# Patient Record
Sex: FEMALE | Race: BLACK OR AFRICAN AMERICAN | NOT HISPANIC OR LATINO | URBAN - METROPOLITAN AREA
[De-identification: names, ages, dates, MRNs, and addresses within clinical notes are randomized per-mention and may not be internally consistent; named-entity substitution may affect disease eponyms.]

---

## 2022-02-01 ENCOUNTER — PREPPED CHART (OUTPATIENT)
Dept: URBAN - METROPOLITAN AREA CLINIC 2 | Facility: CLINIC | Age: 77
End: 2022-02-01

## 2022-02-01 PROBLEM — H35.033 HYPERTENSIVE RETINOPATHY: Noted: 2022-02-01

## 2022-02-01 PROBLEM — H31.103 DOMINANT DRUSEN: Noted: 2022-02-01

## 2022-02-01 PROBLEM — H31.103 PERIPHERAL DRUSEN: Noted: 2022-02-01

## 2022-02-01 PROBLEM — Z96.1 PSEUDOPHAKIA: Noted: 2022-02-01

## 2022-02-01 PROBLEM — H35.3231 NEOVASCULAR AMD WITH ACTIVE CNV: Noted: 2022-02-01

## 2022-03-11 ASSESSMENT — VISUAL ACUITY
OD_CC: 20/20
OS_CC: 20/20-1

## 2022-03-11 ASSESSMENT — TONOMETRY
OD_IOP_MMHG: 10
OS_IOP_MMHG: 10

## 2022-03-15 ENCOUNTER — FOLLOW UP (OUTPATIENT)
Dept: URBAN - METROPOLITAN AREA CLINIC 2 | Facility: CLINIC | Age: 77
End: 2022-03-15

## 2022-03-15 DIAGNOSIS — H35.3231: ICD-10-CM

## 2022-03-15 DIAGNOSIS — H35.033: ICD-10-CM

## 2022-03-15 DIAGNOSIS — H31.103: ICD-10-CM

## 2022-03-15 PROCEDURE — 92202 OPSCPY EXTND ON/MAC DRAW: CPT

## 2022-03-15 PROCEDURE — 92134 CPTRZ OPH DX IMG PST SGM RTA: CPT

## 2022-03-15 PROCEDURE — 99214 OFFICE O/P EST MOD 30 MIN: CPT

## 2022-03-15 ASSESSMENT — VISUAL ACUITY
OS_SC: 20/25-1
OD_SC: 20/40-1
OD_PH: 20/25-2

## 2022-03-15 ASSESSMENT — TONOMETRY
OS_IOP_MMHG: 17
OD_IOP_MMHG: 17

## 2022-04-26 ENCOUNTER — FOLLOW UP (OUTPATIENT)
Dept: URBAN - METROPOLITAN AREA CLINIC 2 | Facility: CLINIC | Age: 77
End: 2022-04-26

## 2022-04-26 DIAGNOSIS — H35.3231: ICD-10-CM

## 2022-04-26 DIAGNOSIS — H35.033: ICD-10-CM

## 2022-04-26 DIAGNOSIS — H31.103: ICD-10-CM

## 2022-04-26 PROCEDURE — 92202 OPSCPY EXTND ON/MAC DRAW: CPT

## 2022-04-26 PROCEDURE — 92134 CPTRZ OPH DX IMG PST SGM RTA: CPT

## 2022-04-26 PROCEDURE — 92014 COMPRE OPH EXAM EST PT 1/>: CPT

## 2022-04-26 ASSESSMENT — VISUAL ACUITY
OD_SC: 20/50
OD_PH: 20/30-2
OS_SC: 20/25-2

## 2022-04-26 ASSESSMENT — TONOMETRY
OD_IOP_MMHG: 12
OS_IOP_MMHG: 12

## 2022-06-07 ENCOUNTER — FOLLOW UP (OUTPATIENT)
Dept: URBAN - METROPOLITAN AREA CLINIC 2 | Facility: CLINIC | Age: 77
End: 2022-06-07

## 2022-06-07 DIAGNOSIS — H35.033: ICD-10-CM

## 2022-06-07 DIAGNOSIS — H35.3231: ICD-10-CM

## 2022-06-07 DIAGNOSIS — H31.103: ICD-10-CM

## 2022-06-07 PROCEDURE — 92202 OPSCPY EXTND ON/MAC DRAW: CPT

## 2022-06-07 PROCEDURE — 92014 COMPRE OPH EXAM EST PT 1/>: CPT

## 2022-06-07 PROCEDURE — 92134 CPTRZ OPH DX IMG PST SGM RTA: CPT

## 2022-06-07 ASSESSMENT — VISUAL ACUITY
OS_SC: 20/25-2
OD_SC: 20/30-3

## 2022-06-07 ASSESSMENT — TONOMETRY
OD_IOP_MMHG: 13
OS_IOP_MMHG: 13

## 2022-06-15 ENCOUNTER — APPOINTMENT (RX ONLY)
Dept: URBAN - METROPOLITAN AREA CLINIC 34 | Facility: CLINIC | Age: 77
Setting detail: DERMATOLOGY
End: 2022-06-15

## 2022-06-15 DIAGNOSIS — D16 BENIGN NEOPLASM OF BONE AND ARTICULAR CARTILAGE: ICD-10-CM | Status: INADEQUATELY CONTROLLED

## 2022-06-15 PROBLEM — D48.5 NEOPLASM OF UNCERTAIN BEHAVIOR OF SKIN: Status: ACTIVE | Noted: 2022-06-15

## 2022-06-15 PROCEDURE — ? TREATMENT REGIMEN

## 2022-06-15 PROCEDURE — 99203 OFFICE O/P NEW LOW 30 MIN: CPT

## 2022-06-15 PROCEDURE — ? COUNSELING

## 2022-06-15 ASSESSMENT — LOCATION ZONE DERM: LOCATION ZONE: SCALP

## 2022-06-15 ASSESSMENT — LOCATION DETAILED DESCRIPTION DERM: LOCATION DETAILED: LEFT OCCIPITAL SCALP

## 2022-06-15 ASSESSMENT — LOCATION SIMPLE DESCRIPTION DERM: LOCATION SIMPLE: POSTERIOR SCALP

## 2022-06-15 NOTE — PROCEDURE: TREATMENT REGIMEN
Plan: Referral to ortho or neurosurgery.  \\n\\nNeeds imaging and workup\\n\\nIt is painful at times but not tender\\n\\nShe had a history of colon ca in 2003.  Not lymphadenopathy   Doubt metastasis. \\n\\nFavor osteoma.   Needs removal with surgeon given pain.  Imaging to be done by surgeon on the case.
Detail Level: Zone

## 2022-06-15 NOTE — HPI: BUMPS
How Severe Are Your Bumps?: mild
Have Your Bumps Been Treated?: not been treated
Is This A New Presentation, Or A Follow-Up?: Bump
Additional History: Pt says bump flares sometimes and causes irritation and discomfort.

## 2022-07-19 ENCOUNTER — FOLLOW UP (OUTPATIENT)
Dept: URBAN - METROPOLITAN AREA CLINIC 2 | Facility: CLINIC | Age: 77
End: 2022-07-19

## 2022-07-19 DIAGNOSIS — H35.3231: ICD-10-CM

## 2022-07-19 DIAGNOSIS — H35.033: ICD-10-CM

## 2022-07-19 DIAGNOSIS — H31.103: ICD-10-CM

## 2022-07-19 PROCEDURE — PFS EYLEA PFS

## 2022-07-19 PROCEDURE — 67028 INJECTION EYE DRUG: CPT

## 2022-07-19 PROCEDURE — 92202 OPSCPY EXTND ON/MAC DRAW: CPT | Mod: 59

## 2022-07-19 PROCEDURE — 92134 CPTRZ OPH DX IMG PST SGM RTA: CPT

## 2022-07-19 PROCEDURE — 92014 COMPRE OPH EXAM EST PT 1/>: CPT | Mod: 25

## 2022-07-19 ASSESSMENT — TONOMETRY
OD_IOP_MMHG: 12
OS_IOP_MMHG: 10

## 2022-07-19 ASSESSMENT — VISUAL ACUITY
OS_SC: 20/25-1
OD_SC: 20/40-1

## 2022-09-09 ENCOUNTER — FOLLOW UP (OUTPATIENT)
Dept: URBAN - METROPOLITAN AREA CLINIC 2 | Facility: CLINIC | Age: 77
End: 2022-09-09

## 2022-09-09 DIAGNOSIS — H35.033: ICD-10-CM

## 2022-09-09 DIAGNOSIS — H31.103: ICD-10-CM

## 2022-09-09 DIAGNOSIS — H35.3231: ICD-10-CM

## 2022-09-09 PROCEDURE — 92202 OPSCPY EXTND ON/MAC DRAW: CPT

## 2022-09-09 PROCEDURE — 92014 COMPRE OPH EXAM EST PT 1/>: CPT

## 2022-09-09 PROCEDURE — 92134 CPTRZ OPH DX IMG PST SGM RTA: CPT

## 2022-09-09 ASSESSMENT — TONOMETRY
OS_IOP_MMHG: 9
OD_IOP_MMHG: 9

## 2022-09-09 ASSESSMENT — VISUAL ACUITY
OD_SC: 20/30-2
OS_SC: 20/20-1

## 2022-10-21 ENCOUNTER — FOLLOW UP (OUTPATIENT)
Dept: URBAN - METROPOLITAN AREA CLINIC 2 | Facility: CLINIC | Age: 77
End: 2022-10-21

## 2022-10-21 DIAGNOSIS — H31.103: ICD-10-CM

## 2022-10-21 DIAGNOSIS — H35.3231: ICD-10-CM

## 2022-10-21 DIAGNOSIS — H35.033: ICD-10-CM

## 2022-10-21 PROCEDURE — 92014 COMPRE OPH EXAM EST PT 1/>: CPT | Mod: 25

## 2022-10-21 PROCEDURE — 92134 CPTRZ OPH DX IMG PST SGM RTA: CPT

## 2022-10-21 PROCEDURE — 92202 OPSCPY EXTND ON/MAC DRAW: CPT

## 2022-10-21 PROCEDURE — 67028 INJECTION EYE DRUG: CPT

## 2022-10-21 PROCEDURE — PFS EYLEA PFS

## 2022-10-21 ASSESSMENT — VISUAL ACUITY
OD_SC: 20/30-2
OS_SC: 20/25-3

## 2022-10-21 ASSESSMENT — TONOMETRY
OS_IOP_MMHG: 17
OD_IOP_MMHG: 14

## 2023-01-03 ENCOUNTER — FOLLOW UP (OUTPATIENT)
Dept: URBAN - METROPOLITAN AREA CLINIC 2 | Facility: CLINIC | Age: 78
End: 2023-01-03

## 2023-01-03 DIAGNOSIS — H35.033: ICD-10-CM

## 2023-01-03 DIAGNOSIS — H35.3231: ICD-10-CM

## 2023-01-03 DIAGNOSIS — H31.103: ICD-10-CM

## 2023-01-03 PROCEDURE — 92202 OPSCPY EXTND ON/MAC DRAW: CPT

## 2023-01-03 PROCEDURE — 92014 COMPRE OPH EXAM EST PT 1/>: CPT

## 2023-01-03 PROCEDURE — 92134 CPTRZ OPH DX IMG PST SGM RTA: CPT

## 2023-01-03 ASSESSMENT — TONOMETRY
OS_IOP_MMHG: 11
OD_IOP_MMHG: 11

## 2023-01-03 ASSESSMENT — VISUAL ACUITY
OD_SC: 20/40-2
OS_SC: 20/30-1
OS_PH: 20/25-2
OD_PH: 20/25

## 2023-02-14 ENCOUNTER — FOLLOW UP (OUTPATIENT)
Dept: URBAN - METROPOLITAN AREA CLINIC 2 | Facility: CLINIC | Age: 78
End: 2023-02-14

## 2023-02-14 DIAGNOSIS — H35.033: ICD-10-CM

## 2023-02-14 DIAGNOSIS — H35.3231: ICD-10-CM

## 2023-02-14 PROCEDURE — 92202 OPSCPY EXTND ON/MAC DRAW: CPT

## 2023-02-14 PROCEDURE — 92134 CPTRZ OPH DX IMG PST SGM RTA: CPT

## 2023-02-14 PROCEDURE — 92014 COMPRE OPH EXAM EST PT 1/>: CPT

## 2023-02-14 ASSESSMENT — VISUAL ACUITY
OD_SC: 20/40
OS_SC: 20/25-1
OD_PH: 20/25+3

## 2023-02-14 ASSESSMENT — TONOMETRY
OD_IOP_MMHG: 8
OS_IOP_MMHG: 7

## 2023-03-28 ENCOUNTER — FOLLOW UP (OUTPATIENT)
Dept: URBAN - METROPOLITAN AREA CLINIC 2 | Facility: CLINIC | Age: 78
End: 2023-03-28

## 2023-03-28 DIAGNOSIS — H35.033: ICD-10-CM

## 2023-03-28 DIAGNOSIS — H31.103: ICD-10-CM

## 2023-03-28 DIAGNOSIS — H35.3231: ICD-10-CM

## 2023-03-28 PROCEDURE — 92014 COMPRE OPH EXAM EST PT 1/>: CPT

## 2023-03-28 PROCEDURE — 92202 OPSCPY EXTND ON/MAC DRAW: CPT

## 2023-03-28 ASSESSMENT — TONOMETRY
OD_IOP_MMHG: 16
OS_IOP_MMHG: 16

## 2023-03-28 ASSESSMENT — VISUAL ACUITY
OD_SC: 20/30
OD_PH: 20/25
OS_SC: 20/25

## 2023-05-09 ENCOUNTER — FOLLOW UP (OUTPATIENT)
Dept: URBAN - METROPOLITAN AREA CLINIC 2 | Facility: CLINIC | Age: 78
End: 2023-05-09

## 2023-05-09 DIAGNOSIS — H35.033: ICD-10-CM

## 2023-05-09 DIAGNOSIS — H35.3231: ICD-10-CM

## 2023-05-09 PROCEDURE — 92202 OPSCPY EXTND ON/MAC DRAW: CPT

## 2023-05-09 PROCEDURE — 92014 COMPRE OPH EXAM EST PT 1/>: CPT

## 2023-05-09 ASSESSMENT — VISUAL ACUITY
OD_PH: 20/20+3
OS_SC: 20/20-1
OD_SC: 20/30+3

## 2023-05-09 ASSESSMENT — TONOMETRY
OD_IOP_MMHG: 14
OS_IOP_MMHG: 20

## 2023-07-14 ENCOUNTER — FOLLOW UP (OUTPATIENT)
Dept: URBAN - METROPOLITAN AREA CLINIC 2 | Facility: CLINIC | Age: 78
End: 2023-07-14

## 2023-07-14 DIAGNOSIS — H35.033: ICD-10-CM

## 2023-07-14 DIAGNOSIS — H35.3231: ICD-10-CM

## 2023-07-14 PROCEDURE — 92202 OPSCPY EXTND ON/MAC DRAW: CPT

## 2023-07-14 PROCEDURE — 92134 CPTRZ OPH DX IMG PST SGM RTA: CPT

## 2023-07-14 PROCEDURE — 92014 COMPRE OPH EXAM EST PT 1/>: CPT

## 2023-07-14 ASSESSMENT — VISUAL ACUITY
OD_PH: 20/20-2
OD_SC: 20/40
OS_SC: 20/20-2

## 2023-07-14 ASSESSMENT — TONOMETRY
OS_IOP_MMHG: 10
OD_IOP_MMHG: 14

## 2023-09-26 ENCOUNTER — FOLLOW UP (OUTPATIENT)
Dept: URBAN - METROPOLITAN AREA CLINIC 2 | Facility: CLINIC | Age: 78
End: 2023-09-26

## 2023-09-26 DIAGNOSIS — H35.033: ICD-10-CM

## 2023-09-26 DIAGNOSIS — H35.3231: ICD-10-CM

## 2023-09-26 DIAGNOSIS — H31.103: ICD-10-CM

## 2023-09-26 PROCEDURE — 92014 COMPRE OPH EXAM EST PT 1/>: CPT

## 2023-09-26 PROCEDURE — 92134 CPTRZ OPH DX IMG PST SGM RTA: CPT

## 2023-09-26 PROCEDURE — 92202 OPSCPY EXTND ON/MAC DRAW: CPT

## 2023-09-26 ASSESSMENT — TONOMETRY
OS_IOP_MMHG: 11
OD_IOP_MMHG: 13

## 2023-09-26 ASSESSMENT — VISUAL ACUITY
OS_SC: 20/25-1
OD_SC: 20/30-2

## 2023-11-28 ENCOUNTER — FOLLOW UP (OUTPATIENT)
Dept: URBAN - METROPOLITAN AREA CLINIC 2 | Facility: CLINIC | Age: 78
End: 2023-11-28

## 2023-11-28 DIAGNOSIS — H35.033: ICD-10-CM

## 2023-11-28 DIAGNOSIS — H31.103: ICD-10-CM

## 2023-11-28 DIAGNOSIS — H35.3231: ICD-10-CM

## 2023-11-28 PROCEDURE — 67028 INJECTION EYE DRUG: CPT

## 2023-11-28 PROCEDURE — 92134 CPTRZ OPH DX IMG PST SGM RTA: CPT

## 2023-11-28 PROCEDURE — 92202 OPSCPY EXTND ON/MAC DRAW: CPT | Mod: 59

## 2023-11-28 PROCEDURE — PFS EYLEA PFS: Mod: JZ

## 2023-11-28 PROCEDURE — 92014 COMPRE OPH EXAM EST PT 1/>: CPT | Mod: 25

## 2023-11-28 ASSESSMENT — VISUAL ACUITY
OD_SC: 20/30+2
OS_SC: 20/25-2

## 2023-11-28 ASSESSMENT — TONOMETRY
OS_IOP_MMHG: 10
OD_IOP_MMHG: 13

## 2024-01-30 ENCOUNTER — FOLLOW UP (OUTPATIENT)
Dept: URBAN - METROPOLITAN AREA CLINIC 2 | Facility: CLINIC | Age: 79
End: 2024-01-30

## 2024-01-30 DIAGNOSIS — H35.3231: ICD-10-CM

## 2024-01-30 DIAGNOSIS — H35.033: ICD-10-CM

## 2024-01-30 DIAGNOSIS — H31.103: ICD-10-CM

## 2024-01-30 PROCEDURE — 92014 COMPRE OPH EXAM EST PT 1/>: CPT | Mod: 25

## 2024-01-30 PROCEDURE — 67028 INJECTION EYE DRUG: CPT

## 2024-01-30 PROCEDURE — PFS EYLEA PFS: Mod: JZ

## 2024-01-30 PROCEDURE — 92202 OPSCPY EXTND ON/MAC DRAW: CPT | Mod: 59

## 2024-01-30 PROCEDURE — 92134 CPTRZ OPH DX IMG PST SGM RTA: CPT

## 2024-01-30 ASSESSMENT — TONOMETRY
OS_IOP_MMHG: 11
OD_IOP_MMHG: 15

## 2024-01-30 ASSESSMENT — VISUAL ACUITY
OS_SC: 20/20-2
OD_SC: 20/30+1

## 2024-04-02 ENCOUNTER — FOLLOW UP (OUTPATIENT)
Dept: URBAN - METROPOLITAN AREA CLINIC 2 | Facility: CLINIC | Age: 79
End: 2024-04-02

## 2024-04-02 DIAGNOSIS — H35.3231: ICD-10-CM

## 2024-04-02 DIAGNOSIS — H31.103: ICD-10-CM

## 2024-04-02 DIAGNOSIS — H35.033: ICD-10-CM

## 2024-04-02 PROCEDURE — PFS EYLEA PFS: Mod: JZ

## 2024-04-02 PROCEDURE — 67028 INJECTION EYE DRUG: CPT

## 2024-04-02 PROCEDURE — 92134 CPTRZ OPH DX IMG PST SGM RTA: CPT

## 2024-04-02 PROCEDURE — 92014 COMPRE OPH EXAM EST PT 1/>: CPT | Mod: 25

## 2024-04-02 PROCEDURE — 92202 OPSCPY EXTND ON/MAC DRAW: CPT | Mod: 59

## 2024-04-02 ASSESSMENT — VISUAL ACUITY
OD_SC: 20/40+2
OS_SC: 20/30-2
OD_PH: 20/20-1
OS_PH: 20/20

## 2024-04-02 ASSESSMENT — TONOMETRY
OD_IOP_MMHG: 11
OS_IOP_MMHG: 10

## 2024-05-28 ENCOUNTER — FOLLOW UP (OUTPATIENT)
Dept: URBAN - METROPOLITAN AREA CLINIC 2 | Facility: CLINIC | Age: 79
End: 2024-05-28

## 2024-05-28 DIAGNOSIS — H35.033: ICD-10-CM

## 2024-05-28 DIAGNOSIS — H31.103: ICD-10-CM

## 2024-05-28 DIAGNOSIS — H35.3231: ICD-10-CM

## 2024-05-28 PROCEDURE — 92014 COMPRE OPH EXAM EST PT 1/>: CPT | Mod: 25

## 2024-05-28 PROCEDURE — 92134 CPTRZ OPH DX IMG PST SGM RTA: CPT

## 2024-05-28 PROCEDURE — 92202 OPSCPY EXTND ON/MAC DRAW: CPT | Mod: 59

## 2024-05-28 PROCEDURE — PFS EYLEA PFS: Mod: JZ

## 2024-05-28 PROCEDURE — 67028 INJECTION EYE DRUG: CPT

## 2024-05-28 ASSESSMENT — TONOMETRY
OS_IOP_MMHG: 11
OD_IOP_MMHG: 10

## 2024-05-28 ASSESSMENT — VISUAL ACUITY
OD_SC: 20/30
OS_SC: 20/20-2
OD_PH: 20/25

## 2024-06-25 ENCOUNTER — FOLLOW UP (OUTPATIENT)
Dept: URBAN - METROPOLITAN AREA CLINIC 2 | Facility: CLINIC | Age: 79
End: 2024-06-25

## 2024-06-25 DIAGNOSIS — H31.103: ICD-10-CM

## 2024-06-25 DIAGNOSIS — H35.3231: ICD-10-CM

## 2024-06-25 DIAGNOSIS — H35.033: ICD-10-CM

## 2024-06-25 PROCEDURE — 92014 COMPRE OPH EXAM EST PT 1/>: CPT | Mod: 25

## 2024-06-25 PROCEDURE — 92202 OPSCPY EXTND ON/MAC DRAW: CPT | Mod: 59

## 2024-06-25 PROCEDURE — 92134 CPTRZ OPH DX IMG PST SGM RTA: CPT

## 2024-06-25 PROCEDURE — 67028 INJECTION EYE DRUG: CPT

## 2024-06-25 ASSESSMENT — VISUAL ACUITY
OS_SC: 20/25
OD_SC: 20/40-1
OD_PH: 20/20-2

## 2024-06-25 ASSESSMENT — TONOMETRY
OS_IOP_MMHG: 10
OD_IOP_MMHG: 10

## 2024-07-30 ENCOUNTER — FOLLOW UP (OUTPATIENT)
Dept: URBAN - METROPOLITAN AREA CLINIC 2 | Facility: CLINIC | Age: 79
End: 2024-07-30

## 2024-07-30 DIAGNOSIS — H31.103: ICD-10-CM

## 2024-07-30 DIAGNOSIS — H35.033: ICD-10-CM

## 2024-07-30 DIAGNOSIS — H35.3231: ICD-10-CM

## 2024-07-30 PROCEDURE — 67028 INJECTION EYE DRUG: CPT

## 2024-07-30 PROCEDURE — 92202 OPSCPY EXTND ON/MAC DRAW: CPT | Mod: NC

## 2024-07-30 PROCEDURE — 92134 CPTRZ OPH DX IMG PST SGM RTA: CPT

## 2024-07-30 PROCEDURE — 92014 COMPRE OPH EXAM EST PT 1/>: CPT | Mod: 25

## 2024-07-30 ASSESSMENT — VISUAL ACUITY
OD_PH: 20/30+2
OD_SC: 20/30
OS_PH: 20/25
OS_SC: 20/30

## 2024-07-30 ASSESSMENT — TONOMETRY
OS_IOP_MMHG: 11
OD_IOP_MMHG: 10